# Patient Record
Sex: FEMALE | Race: ASIAN
[De-identification: names, ages, dates, MRNs, and addresses within clinical notes are randomized per-mention and may not be internally consistent; named-entity substitution may affect disease eponyms.]

---

## 2018-01-31 ENCOUNTER — RESULT REVIEW (OUTPATIENT)
Age: 38
End: 2018-01-31

## 2018-02-27 ENCOUNTER — RESULT REVIEW (OUTPATIENT)
Age: 38
End: 2018-02-27

## 2020-09-30 PROBLEM — Z00.00 ENCOUNTER FOR PREVENTIVE HEALTH EXAMINATION: Status: ACTIVE | Noted: 2020-09-30

## 2021-08-23 ENCOUNTER — NON-APPOINTMENT (OUTPATIENT)
Age: 41
End: 2021-08-23

## 2021-08-25 ENCOUNTER — NON-APPOINTMENT (OUTPATIENT)
Age: 41
End: 2021-08-25

## 2021-08-26 ENCOUNTER — APPOINTMENT (OUTPATIENT)
Dept: COLORECTAL SURGERY | Facility: CLINIC | Age: 41
End: 2021-08-26
Payer: COMMERCIAL

## 2021-08-26 VITALS
TEMPERATURE: 98.2 F | SYSTOLIC BLOOD PRESSURE: 119 MMHG | HEIGHT: 64 IN | HEART RATE: 87 BPM | WEIGHT: 109 LBS | BODY MASS INDEX: 18.61 KG/M2 | DIASTOLIC BLOOD PRESSURE: 81 MMHG

## 2021-08-26 DIAGNOSIS — K64.8 OTHER HEMORRHOIDS: ICD-10-CM

## 2021-08-26 DIAGNOSIS — Z83.3 FAMILY HISTORY OF DIABETES MELLITUS: ICD-10-CM

## 2021-08-26 DIAGNOSIS — L29.0 PRURITUS ANI: ICD-10-CM

## 2021-08-26 DIAGNOSIS — Z72.89 OTHER PROBLEMS RELATED TO LIFESTYLE: ICD-10-CM

## 2021-08-26 DIAGNOSIS — Z82.49 FAMILY HISTORY OF ISCHEMIC HEART DISEASE AND OTHER DISEASES OF THE CIRCULATORY SYSTEM: ICD-10-CM

## 2021-08-26 DIAGNOSIS — Z78.9 OTHER SPECIFIED HEALTH STATUS: ICD-10-CM

## 2021-08-26 PROCEDURE — 46600 DIAGNOSTIC ANOSCOPY SPX: CPT

## 2021-08-26 PROCEDURE — 99203 OFFICE O/P NEW LOW 30 MIN: CPT | Mod: 25

## 2021-08-26 RX ORDER — HYDROCORTISONE 25 MG/G
2.5 CREAM TOPICAL
Qty: 30 | Refills: 3 | Status: ACTIVE | COMMUNITY
Start: 2021-08-26 | End: 1900-01-01

## 2021-08-26 RX ORDER — AMITRIPTYLINE HYDROCHLORIDE 10 MG/1
10 TABLET, FILM COATED ORAL
Refills: 0 | Status: ACTIVE | COMMUNITY

## 2021-08-26 NOTE — ASSESSMENT
[FreeTextEntry1] : Exam findings and diagnosis were discussed at length with patient. \par Toilet behaviors and perianal hygiene discussed.\par Avoid over-cleaning/wiping, avoid scratching. \par Continue IBS-mixed management per GI - Dr Ruiz. Was also advised to start increasing fiber by GI. \par Educational material regarding fiber provided.\par Adequate oral hydration - goal 64oz water/day.\par Medical management, such as hydrocortisone cream, was discussed.\par Office based treatment, such as rubber band ligation, was discussed as an alternative if symptoms persist despite conservative management.\par Patient declined office procedure today. Hydrocortisone prescription provided. If symptoms persist or worsen, recommend follow up evaluation for possible further treatment at that time. \par All questions answered, patient expressed understanding and is agreeable to this plan.\par

## 2021-08-26 NOTE — HISTORY OF PRESENT ILLNESS
[FreeTextEntry1] : 40 y/o F presents for evaluation of possible hemorrhoids, referred by Dr. Ruiz \par H/o possible IBS-mixed, on amitriptyline for the past 3 months\par \par H/o pruritus ani, treated with topical ointments several years ago. Has recently noticed and increase in itching sensation with wiping\par Reports intermittent BRBPR in the bowl that patient states is significant enough that at times appears to look like her menses. Bleeding has been happening with some but not all BM's for the past year\par Denies pain \par Not currently using any OTC or prescription medications for symptoms \par BH:2-3 times daily \par Reports occasional constipation where she will skip a day and then have sensation of rectal pressure followed by diarrhea. Prescribed Linzess and Amatiza in the past but caused diarrhea \par Reports adequate dietary fiber intake. Currently drinking 20-32 oz.of water daily \par Last colonoscopy completed 10/26/2020, benign polyp removed. Previous colonoscopy completed in 2015 which was normal \par Denies FMH of GI disorders or colorectal cancer\par No ASA/NSAIDs last 7 days \par \par H/o ASCUS on cervical pap in 2018 s/p cervical biopsy - results benign

## 2024-08-27 ENCOUNTER — APPOINTMENT (OUTPATIENT)
Dept: COLORECTAL SURGERY | Facility: CLINIC | Age: 44
End: 2024-08-27
Payer: COMMERCIAL

## 2024-08-27 ENCOUNTER — NON-APPOINTMENT (OUTPATIENT)
Age: 44
End: 2024-08-27

## 2024-08-27 VITALS
HEART RATE: 68 BPM | HEIGHT: 64 IN | BODY MASS INDEX: 18.78 KG/M2 | DIASTOLIC BLOOD PRESSURE: 72 MMHG | WEIGHT: 110 LBS | TEMPERATURE: 98.5 F | SYSTOLIC BLOOD PRESSURE: 128 MMHG

## 2024-08-27 DIAGNOSIS — L29.0 PRURITUS ANI: ICD-10-CM

## 2024-08-27 PROCEDURE — 99203 OFFICE O/P NEW LOW 30 MIN: CPT | Mod: 25

## 2024-08-27 PROCEDURE — 46600 DIAGNOSTIC ANOSCOPY SPX: CPT

## 2024-08-27 NOTE — PHYSICAL EXAM
[FreeTextEntry1] : Medical assistant present for duration of physical examination  General no acute distress, alert and oriented Psych in good spirits, responding appropriately to questions Nonlabored breathing Ambulating without assistance  Anorectal Exam: Inspection no erythema, induration or fluctuance, mild skin irritation of left lateral perianal skin, soft noninflamed external hemorrhoidal cushions without thrombosis REGINALDO nontender, no masses palpated, no blood on gloved finger  Procedure: Anoscopy  Pre procedure Diagnosis: pruritus ani Post procedure Diagnosis: pruritus ani Anesthesia: none Estimated blood loss: none Specimen: none Complications: none  Consent obtained. Anoscopy was performed by passing a lighted anoscope with lubricant jelly into the anal canal and the entire anal mucosal surface was inspected. Findings included no fissure, mild internal hemorrhoids, small, without inflammation, no visible masses or lesions  Patient tolerated examination and procedure well.

## 2024-08-27 NOTE — HISTORY OF PRESENT ILLNESS
[FreeTextEntry1] : 45yo female presents to re-establish care.  GI Dr Ruiz H/o possible IBS-mixed  H/o pruritus ani, treated with topical ointments  Last colonoscopy: 2015 which was normal 10/26/2020, benign polyp removed.   Seen previously 8/26/21 Exam noted mild pruritus of perianal skin, soft noninflamed external hemorrhoidal cushions nonthrombosed. Anoscopy noted no fissure, mild internal hemorrhoids left lateral and right posterior - right anterior mild to moderate. Management options of hemorrhoids discussed. Patient declined office procedure. Hydrocortisone prescription provided.  Returns today with complaints of anal itching, episodic, since her last visit in 2021.  Has continued to use hydrocortisone cream that intermittently and reports it helps.  Reports has been wiping vigorously with wet wipes. Will see occasional blood on wet wipes.  Has not seen a dermatologist but admits will occasionally get intermittent episodes of eczema on her ear and neck.   Since last visit, had a colonoscopy 3/2024 with Dr. Ruiz.  Reports findings of hemorrhoids and a couple of polyp removes and was told she had tubular adenoma. Recommended follow up in three years.   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   Currently bowel movements are usually two to three times a day. Will often times feel bloated. Stool is soft and formed. Denies bleeding. Occasionally will feel pain in groin area while straining. Reports lots of straining. Frequent toileting due to urge to defecate because of gassiness. Noticed increased flatus but not belching.   ETOH: social drinker.

## 2024-08-27 NOTE — ASSESSMENT
[FreeTextEntry1] : Exam findings and diagnosis were discussed at length with patient. Recommend avoid scratching, avoid overcleaning.  Recommend stopping wet wipe use. Recommend water to clean/bidet or a sitz baths.  Continue GI management of newly diagnosed digestive deficiencies. Avoid diarrhea/constipation. Recommend topical barrier agents. Discussed steroid based cream can be used topically as needed; however recommend avoiding long-term use of steroid based medications. Consider dermatology consultation given h/o eczema. All questions answered, patient expressed understanding and is agreeable to this plan.